# Patient Record
Sex: FEMALE | Race: OTHER | NOT HISPANIC OR LATINO | URBAN - METROPOLITAN AREA
[De-identification: names, ages, dates, MRNs, and addresses within clinical notes are randomized per-mention and may not be internally consistent; named-entity substitution may affect disease eponyms.]

---

## 2019-12-05 ENCOUNTER — EMERGENCY (EMERGENCY)
Facility: HOSPITAL | Age: 64
LOS: 1 days | Discharge: HOME | End: 2019-12-05
Admitting: EMERGENCY MEDICINE
Payer: OTHER MISCELLANEOUS

## 2019-12-05 VITALS
TEMPERATURE: 98 F | SYSTOLIC BLOOD PRESSURE: 131 MMHG | RESPIRATION RATE: 18 BRPM | HEART RATE: 57 BPM | DIASTOLIC BLOOD PRESSURE: 86 MMHG | WEIGHT: 111.99 LBS | OXYGEN SATURATION: 96 % | HEIGHT: 59 IN
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
## 2019-12-05 DIAGNOSIS — M25.521 PAIN IN RIGHT ELBOW: ICD-10-CM
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
## 2019-12-05 PROCEDURE — 99283 EMERGENCY DEPT VISIT LOW MDM: CPT

## 2019-12-05 PROCEDURE — 73080 X-RAY EXAM OF ELBOW: CPT | Mod: 26,RT

## 2019-12-05 PROCEDURE — 73080 X-RAY EXAM OF ELBOW: CPT

## 2019-12-05 RX ORDER — IBUPROFEN 200 MG
1 TABLET ORAL
Qty: 21 | Refills: 0
Start: 2019-12-05 | End: 2019-12-11

## 2019-12-05 RX ORDER — IBUPROFEN 200 MG
600 TABLET ORAL ONCE
Refills: 0 | Status: COMPLETED | OUTPATIENT
Start: 2019-12-05 | End: 2019-12-05

## 2019-12-05 RX ADMIN — Medication 600 MILLIGRAM(S): at 15:07

## 2019-12-05 NOTE — ED ADULT NURSE NOTE - OBJECTIVE STATEMENT
63 yo F presents to Ed c/o R elbow pain. Pt states, " I had an injury on the 29th, the freezer door hit into my arm, and its been hurting since then and it's hard for me to make a fist now." No obvious malformation, deformity, bruising, rash noted. Upon assessment pt skin is warm to touch, no swelling noted, slight limited ROM of RUE, cap refill <2 seconds, decreased  strength to R hand. Pt denies any numbness, tingling, medication use for the pain. NAD, VSS, will continue to assess.

## 2019-12-05 NOTE — ED PROVIDER NOTE - OBJECTIVE STATEMENT
65 yo female in the ER c/o right arm pain x 1 week. Pt states about a week ago heavy door from a freezer fell on her right elbow. Pt able to move her arm and elbow, but pain persist and she is concerned about possible fx. Pt also reports her right hand   is not so strong as usually. Pt denies numbness, tingling, discoloration to her right arm.

## 2019-12-05 NOTE — ED PROVIDER NOTE - PATIENT PORTAL LINK FT
You can access the FollowMyHealth Patient Portal offered by [facility] by registering at the following website: [email or web address]. By joining [employer]’s FollowMyHealth portal, you will also be able to view your health information using other applications (apps) compatible with our system.

## 2019-12-05 NOTE — ED PROVIDER NOTE - NSFOLLOWUPINSTRUCTIONS_ED_ALL_ED_FT
I have discussed the discharge plan with the patient. The patient agrees with the plan, as discussed.  The patient understands Emergency Department diagnosis is a preliminary diagnosis often based on limited information and that the patient must adhere to the follow-up plan as discussed.  The patient understands that if the symptoms worsen or if prescribed medications do not have the desired/planned effect that the patient may return to the Emergency Department at any time for further evaluation and treatment.      Elbow Contusion  An elbow contusion is a deep bruise of the elbow. Contusions are the result of a blunt injury to tissues and muscle fibers under the skin. The injury causes bleeding under the skin. The skin overlying the contusion may turn blue, purple, or yellow. Minor injuries will give you a painless contusion, but more severe contusions may stay painful and swollen for a few weeks.  What are the causes?  Common causes of this condition include:  A hard hit to the elbow.An injury (trauma) to the elbow.Direct force on the elbow, such as from a fall.What increases the risk?  You are more likely to develop this condition if you:  Play sports or do other physical activities.Use blood thinners.What are the signs or symptoms?  Symptoms of this condition include:  Swelling of the elbow.Pain and tenderness of the elbow.Discoloration of the elbow. The area may have redness and then turn blue, purple, or yellow.How is this diagnosed?  This condition is diagnosed based on:  Your symptoms and medical history.A physical exam.You may also need an X-ray to determine if there are any associated injuries, such as broken bones (fractures).  An MRI might be done if the swelling and pain do not go away in a few weeks.  How is this treated?  This condition may be treated with:  Rest, ice, pressure (compression), and elevation. This is often called RICE therapy. In general, this is considered the best treatment for this condition.A sling or splint to support your injury.Over-the-counter anti-inflammatory medicines, such as ibuprofen, for pain control.Range-of-motion exercises.Follow these instructions at home:  RICE therapy     Rest the injured area.If directed, put ice on the injured area:  If you have a removable sling or splint, remove it as told by your health care provider.Put ice in a plastic bag.Place a towel between your skin and the bag.Leave the ice on for 20 minutes, 2–3 times a day.If directed, apply light compression to the injured area using an elastic bandage. Make sure the bandage is not wrapped too tightly. Remove and reapply the bandage as directed by your health care provider.Raise (elevate) the injured area above the level of your heart while you are sitting or lying down.If you have a sling or splint:        Wear the sling or splint as told by your health care provider. Remove it only as told by your health care provider.Loosen the sling or splint if your fingers tingle, become numb, or turn cold and blue.Keep the sling or splint clean.If the sling or splint is not waterproof:  Do not let it get wet.Cover it with a watertight covering when you take a bath or a shower.General instructions     Take over-the-counter and prescription medicines only as told by your health care provider.Return to your normal activities as told by your health care provider. Ask your health care provider what activities are safe for you.Do range-of-motion exercises only as told by your health care provider.Ask your health care provider when it is safe to drive if you have a sling or splint on your arm.Wear elbow pads as told by your health care provider.Keep all follow-up visits as told by your health care provider. This is important.Contact a health care provider if:  Your symptoms do not improve after several days of treatment.You have more redness, swelling, or pain in your elbow.You have difficulty moving the injured area.Your swelling or pain is not relieved with medicines.Get help right away if:  Your skin over the contusion breaks and starts bleeding.You have severe pain.You have numbness in your hand or fingers.Your hand or fingers turn pale or cold.You have swelling of your hand and fingers.You cannot move your fingers or wrist.Summary  An elbow contusion is a deep bruise of the elbow.Symptoms include pain, swelling, and discoloration of the elbow.Rest the injured area and apply ice to the area as told by your health care provider.If directed, apply light compression to the injured area using an elastic bandage.Raise (elevate) the injured area above the level of your heart while you are sitting or lying down.This information is not intended to replace advice given to you by your health care provider. Make sure you discuss any questions you have with your health care provider.

## 2019-12-05 NOTE — ED PROVIDER NOTE - MUSCULOSKELETAL, MLM
Spine and all extremities grossly appears normal, range of motion is not limited,  diffuse tenderness to elbow joint, mild edema,

## 2019-12-05 NOTE — ED PROVIDER NOTE - CLINICAL SUMMARY MEDICAL DECISION MAKING FREE TEXT BOX
65 yo female in the ER had accidentally injured her right elbow 1 week ago- pt reports heavy freezer door fell on her elbow. Pt is able to move her elbow but its still very painful pain radiate above and below her injured elbow.

## 2019-12-05 NOTE — ED PROVIDER NOTE - CARE PROVIDER_API CALL
Az Herrmann)  Orthopaedic Surgery  [address] [place] [address]  Phone: [phone]  Fax: [phone]  Follow Up Time:

## 2021-12-25 ENCOUNTER — EMERGENCY (EMERGENCY)
Facility: HOSPITAL | Age: 66
LOS: 1 days | Discharge: HOME | End: 2021-12-25
Attending: STUDENT IN AN ORGANIZED HEALTH CARE EDUCATION/TRAINING PROGRAM | Admitting: STUDENT IN AN ORGANIZED HEALTH CARE EDUCATION/TRAINING PROGRAM
Payer: COMMERCIAL

## 2021-12-25 VITALS
HEIGHT: 59 IN | WEIGHT: 134.92 LBS | RESPIRATION RATE: 20 BRPM | TEMPERATURE: 98 F | HEART RATE: 100 BPM | OXYGEN SATURATION: 97 % | DIASTOLIC BLOOD PRESSURE: 85 MMHG | SYSTOLIC BLOOD PRESSURE: 127 MMHG

## 2021-12-25 DIAGNOSIS — Z87.891 PERSONAL HISTORY OF NICOTINE DEPENDENCE: ICD-10-CM

## 2021-12-25 DIAGNOSIS — H11.152 PINGUECULA, LEFT EYE: ICD-10-CM

## 2021-12-25 DIAGNOSIS — E78.5 HYPERLIPIDEMIA, UNSPECIFIED: ICD-10-CM

## 2021-12-25 DIAGNOSIS — Z20.822 CONTACT WITH AND (SUSPECTED) EXPOSURE TO COVID-19: ICD-10-CM

## 2021-12-25 DIAGNOSIS — R42 DIZZINESS AND GIDDINESS: ICD-10-CM

## 2021-12-25 LAB
ALBUMIN SERPL ELPH-MCNC: 4.4 G/DL — SIGNIFICANT CHANGE UP (ref 3.3–5)
ALP SERPL-CCNC: 100 U/L — SIGNIFICANT CHANGE UP (ref 40–120)
ALT FLD-CCNC: 21 U/L — SIGNIFICANT CHANGE UP (ref 10–45)
ANION GAP SERPL CALC-SCNC: 12 MMOL/L — SIGNIFICANT CHANGE UP (ref 5–17)
APTT BLD: 32.4 SEC — SIGNIFICANT CHANGE UP (ref 27.5–35.5)
AST SERPL-CCNC: 23 U/L — SIGNIFICANT CHANGE UP (ref 10–40)
BASOPHILS # BLD AUTO: 0.04 K/UL — SIGNIFICANT CHANGE UP (ref 0–0.2)
BASOPHILS NFR BLD AUTO: 0.6 % — SIGNIFICANT CHANGE UP (ref 0–2)
BILIRUB SERPL-MCNC: 0.2 MG/DL — SIGNIFICANT CHANGE UP (ref 0.2–1.2)
BUN SERPL-MCNC: 11 MG/DL — SIGNIFICANT CHANGE UP (ref 7–23)
CALCIUM SERPL-MCNC: 9.2 MG/DL — SIGNIFICANT CHANGE UP (ref 8.4–10.5)
CHLORIDE SERPL-SCNC: 108 MMOL/L — SIGNIFICANT CHANGE UP (ref 96–108)
CO2 SERPL-SCNC: 24 MMOL/L — SIGNIFICANT CHANGE UP (ref 22–31)
CREAT SERPL-MCNC: 0.76 MG/DL — SIGNIFICANT CHANGE UP (ref 0.5–1.3)
CRP SERPL-MCNC: <3 MG/L — SIGNIFICANT CHANGE UP (ref 0–4)
EOSINOPHIL # BLD AUTO: 0.22 K/UL — SIGNIFICANT CHANGE UP (ref 0–0.5)
EOSINOPHIL NFR BLD AUTO: 3.1 % — SIGNIFICANT CHANGE UP (ref 0–6)
GLUCOSE SERPL-MCNC: 155 MG/DL — HIGH (ref 70–99)
HCT VFR BLD CALC: 40.2 % — SIGNIFICANT CHANGE UP (ref 34.5–45)
HGB BLD-MCNC: 13.4 G/DL — SIGNIFICANT CHANGE UP (ref 11.5–15.5)
IMM GRANULOCYTES NFR BLD AUTO: 0.1 % — SIGNIFICANT CHANGE UP (ref 0–1.5)
INR BLD: 1.02 — SIGNIFICANT CHANGE UP (ref 0.88–1.16)
LYMPHOCYTES # BLD AUTO: 3.12 K/UL — SIGNIFICANT CHANGE UP (ref 1–3.3)
LYMPHOCYTES # BLD AUTO: 44.6 % — HIGH (ref 13–44)
MCHC RBC-ENTMCNC: 30 PG — SIGNIFICANT CHANGE UP (ref 27–34)
MCHC RBC-ENTMCNC: 33.3 GM/DL — SIGNIFICANT CHANGE UP (ref 32–36)
MCV RBC AUTO: 89.9 FL — SIGNIFICANT CHANGE UP (ref 80–100)
MONOCYTES # BLD AUTO: 0.71 K/UL — SIGNIFICANT CHANGE UP (ref 0–0.9)
MONOCYTES NFR BLD AUTO: 10.2 % — SIGNIFICANT CHANGE UP (ref 2–14)
NEUTROPHILS # BLD AUTO: 2.89 K/UL — SIGNIFICANT CHANGE UP (ref 1.8–7.4)
NEUTROPHILS NFR BLD AUTO: 41.4 % — LOW (ref 43–77)
NRBC # BLD: 0 /100 WBCS — SIGNIFICANT CHANGE UP (ref 0–0)
PLATELET # BLD AUTO: 306 K/UL — SIGNIFICANT CHANGE UP (ref 150–400)
POTASSIUM SERPL-MCNC: 4.1 MMOL/L — SIGNIFICANT CHANGE UP (ref 3.5–5.3)
POTASSIUM SERPL-SCNC: 4.1 MMOL/L — SIGNIFICANT CHANGE UP (ref 3.5–5.3)
PROT SERPL-MCNC: 7.4 G/DL — SIGNIFICANT CHANGE UP (ref 6–8.3)
PROTHROM AB SERPL-ACNC: 12.2 SEC — SIGNIFICANT CHANGE UP (ref 10.6–13.6)
RBC # BLD: 4.47 M/UL — SIGNIFICANT CHANGE UP (ref 3.8–5.2)
RBC # FLD: 14.2 % — SIGNIFICANT CHANGE UP (ref 10.3–14.5)
SODIUM SERPL-SCNC: 144 MMOL/L — SIGNIFICANT CHANGE UP (ref 135–145)
TROPONIN T SERPL-MCNC: 0.01 NG/ML — SIGNIFICANT CHANGE UP (ref 0–0.01)
WBC # BLD: 6.99 K/UL — SIGNIFICANT CHANGE UP (ref 3.8–10.5)
WBC # FLD AUTO: 6.99 K/UL — SIGNIFICANT CHANGE UP (ref 3.8–10.5)

## 2021-12-25 PROCEDURE — 0042T: CPT

## 2021-12-25 PROCEDURE — 71045 X-RAY EXAM CHEST 1 VIEW: CPT | Mod: 26

## 2021-12-25 PROCEDURE — 70450 CT HEAD/BRAIN W/O DYE: CPT | Mod: 26,59,MA

## 2021-12-25 PROCEDURE — 99284 EMERGENCY DEPT VISIT MOD MDM: CPT

## 2021-12-25 PROCEDURE — 70498 CT ANGIOGRAPHY NECK: CPT | Mod: 26,MA

## 2021-12-25 PROCEDURE — 99285 EMERGENCY DEPT VISIT HI MDM: CPT | Mod: 25

## 2021-12-25 PROCEDURE — 70496 CT ANGIOGRAPHY HEAD: CPT | Mod: 26,MA

## 2021-12-25 RX ORDER — ASPIRIN/CALCIUM CARB/MAGNESIUM 324 MG
324 TABLET ORAL ONCE
Refills: 0 | Status: COMPLETED | OUTPATIENT
Start: 2021-12-25 | End: 2021-12-25

## 2021-12-25 RX ADMIN — Medication 324 MILLIGRAM(S): at 19:46

## 2021-12-25 NOTE — ED PROVIDER NOTE - NSFOLLOWUPINSTRUCTIONS_ED_ALL_ED_FT
1) Please follow-up with an ophthalmologist about your eye pain - please call the number below to be assisted with making an appointment.  If you cannot follow-up with your doctor(s), please return to the ED for any urgent issues.  2) If you have any worsening of symptoms including severe headache, difficulty balancing, loss of vision, or any other concerns please return to the ED immediately.  3) Please continue taking your home medications as directed.  4) You may have been given a copy of your labs and/or imaging.  Please go over these with your primary care doctor.   5) A prescription has been sent to your pharmacy. Please take it as directed.       Please reach out to Brittney Alejandro ([facility] ED clinical referral coordinator) to assist you with your follow-up appointment.     Monday - Friday 11am-7pm  [phone]  [email or web address]

## 2021-12-25 NOTE — CONSULT NOTE ADULT - ASSESSMENT
ASSESSMENT/PLAN: ASSESSMENT/PLAN: 66y Chinese speaking Female with PMHx of HLD (on atorvastatin 10) and PUD, presenting to the ED with dizziness, left eye blurriness, and nausea. Pt's vision improved followed by left eye pain and redness, but she felt unsteady when walking. 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 On reeval, pt able to ambulate steadily with narrow gait and no dizziness. Her only complaint at this time is left eye pain, and eye remains erythematous. Unlikely CVA as cause for her symptoms. ESR and CRP are normal therefore less suspicion for temporal arteritis. Recommend ophthalmology evaluation for her eye.     Thank you for allowing us to participate in the care of this patient, please call Neurology with questions as needed.   - case discussed with Dr Garrido stroke attending and Dr. Gomez ED attending

## 2021-12-25 NOTE — ED PROVIDER NOTE - PATIENT PORTAL LINK FT
You can access the FollowMyHealth Patient Portal offered by [facility] by registering at the following website: [email or web address]. By joining [facility]’s FollowMyHealth portal, you will also be able to view your health information using other applications (apps) compatible with our system.

## 2021-12-25 NOTE — ED PROVIDER NOTE - OBJECTIVE STATEMENT
65 y/o F pt w/ PMHx of HLD and PUD, presents to the ED for intermittent dizziness sensation, nausea, and left eye blurriness since 10:00PM last night. At present in the ED, patient states symptoms have abated. Denies prior history of symptoms, and associated CP, palpitations, vomiting, coughing, fevers, chills, and HA. Patient has a remote history of smoking , but quit smoking cigarettes over 30 years ago. 67 y/o F pt w/ PMHx of HLD and PUD, presents to the ED for intermittent dizziness sensation, nausea, and left eye blurriness since 10:00PM last night. At present in the ED, patient states symptoms have abated. Denies prior history of symptoms, and associated CP, palpitations, vomiting, coughing, fevers, chills, and HA. Patient has a remote history of smoking, but quit smoking cigarettes over 30 years ago.

## 2021-12-25 NOTE — ED PROVIDER NOTE - CLINICAL SUMMARY MEDICAL DECISION MAKING FREE TEXT BOX
65 y/o F pt who is a well-appearing female in no apparent distress and stable vital signs, presents to the ED for dizziness, nausea, and L eye blurriness since 10:00PM last night. Patient with NIHSS of 0 at present. On exam, patient has what appears to be a pinguecula affecting the left eye which she says is new since yesterday. As such, I doubt a neurologic component to vision change. However, given dizziness complaint, will obtain CT scan to assess for possible posterior CVA. If workup is negative, will consider discharge with close neurology and ophthalmology f/u. 67 y/o F pt who is a well-appearing female, in no apparent distress and has stable vital signs, presents to the ED for dizziness, nausea, and L eye blurriness since 10:00PM last night. Patient has a NIHSS of 0 at present. On exam, patient has what appears to be a pinguecula affecting the left eye, which she says is new since yesterday. As such, I doubt a neurologic component to her vision change. However, given dizziness complaint, will obtain CT scan to assess for possible posterior CVA. If workup is negative, will consider discharge with close neurology and ophthalmology f/u.

## 2021-12-25 NOTE — ED ADULT NURSE NOTE - OBJECTIVE STATEMENT
Patient presents to the ED complaining of dizziness from last night around 10AM. As per son, patient also noted L eye redness today with eye swelling and also had blurry vision yesterday. Patient denies any weakness. No facial droop noted. Patient presents to the ED complaining of dizziness from last night around 10AM. As per son, patient also noted L eye redness today with eye swelling and also had blurry vision yesterday in the L eye Patient denies any weakness. No facial droop noted. Patient not slurry her speech. History given by son Andrés. Patient presents to the ED complaining of dizziness from last night around 10AM. As per son, patient also noted L eye redness today with eye swelling and also had blurry vision yesterday in the L eye Patient denies any weakness. No facial droop noted. Patient not slurry her speech. History given by son Andrés. Patient upgraded to MD Gomez.

## 2021-12-25 NOTE — ED PROVIDER NOTE - NSPTACCESSSVCSAPPTDETAILS_ED_ALL_ED_FT
pt w/ L eye pain and physical exam abnormality c/w pinguecula. Should see optho very soon. pt w/ L eye pain and physical exam abnormality c/w pinguecula. Should see optho very soon.  Should also be referred to neuro for her dizziness.  thanks again!

## 2021-12-25 NOTE — ED PROVIDER NOTE - PHYSICAL EXAMINATION
Constitutional: Well appearing, awake, alert, oriented to person, place, time/situation and in no apparent distress.  ENMT: Airway patent.  Eyes: Clear bilaterally, pupils equal, round and reactive to light b/l. (+) pinguecula to the L lateral sclera.    Cardiac: Normal rate, regular rhythm.  Heart sounds S1, S2.  Respiratory: Breath sounds clear and equal bilaterally.  Gastrointestinal: Abdomen soft, non-tender, no guarding.  Neurological: NIH scale provided below  Level of Consciousness: 0  Knows Month and Age: 0  Follows commands: 0  Horizontal EOM: 0  Visual Fields: 0  Facial Palsy: 0  Left Arm Motor Drift: 0  Right Arm Motor Drift: 0  Left Leg Motor Drift: 0  Right Leg Motor Drift: 0  Limb Ataxia: 0  Sensation: 0  Language Aphasia: 0  Dysarthria: 0  Extinction / Inattention: 0  NIHSS: 0  Skin: No evidence of rash. Constitutional: Well appearing, awake, alert, oriented to person, place, time/situation and in no apparent distress.  ENMT: Airway patent.  Eyes: Clear bilaterally, pupils equal, round and reactive to light b/l. (+) pinguecula to the L lateral sclera.    Cardiac: Normal rate, regular rhythm.  Heart sounds S1, S2.  Respiratory: Breath sounds clear and equal bilaterally.  Gastrointestinal: Abdomen soft, non-tender, no guarding.  Neurological: NIHSS scale provided below.    Level of Consciousness: 0    Knows Month and Age: 0    Follows commands: 0    Horizontal EOM: 0    Visual Fields: 0    Facial Palsy: 0    Left Arm Motor Drift: 0    Right Arm Motor Drift: 0    Left Leg Motor Drift: 0    Right Leg Motor Drift: 0    Limb Ataxia: 0    Sensation: 0    Language Aphasia: 0    Dysarthria: 0    Extinction / Inattention: 0   NIHSS: 0  Skin: No evidence of rash. Constitutional: Well appearing, awake, alert, oriented to person, place, time/situation and in no apparent distress.  ENMT: Airway patent.  Eyes: Clear bilaterally, pupils equal, round and reactive to light b/l. (+) pinguecula to the L lateral sclera.    Cardiac: Normal rate, regular rhythm.  Heart sounds S1, S2.  Respiratory: Breath sounds clear and equal bilaterally.  Gastrointestinal: Abdomen soft, non-tender, no guarding.  Neurological: NIHSS provided below.    Level of Consciousness: 0    Knows Month and Age: 0    Follows commands: 0    Horizontal EOM: 0    Visual Fields: 0    Facial Palsy: 0    Left Arm Motor Drift: 0    Right Arm Motor Drift: 0    Left Leg Motor Drift: 0    Right Leg Motor Drift: 0    Limb Ataxia: 0    Sensation: 0    Language Aphasia: 0    Dysarthria: 0    Extinction / Inattention: 0   NIHSS: 0  Skin: No evidence of rash. Constitutional: Well appearing, awake, alert, oriented to person, place, time/situation and in no apparent distress.  ENMT: Airway patent.  Eyes: Clear bilaterally, pupils equal, round and reactive to light b/l. (+) pinguecula to the L lateral sclera.  IOP of 18.8.  Cardiac: Normal rate, regular rhythm.  Heart sounds S1, S2.  Respiratory: Breath sounds clear and equal bilaterally.  Gastrointestinal: Abdomen soft, non-tender, no guarding.  Neurological: NIHSS provided below.    Level of Consciousness: 0    Knows Month and Age: 0    Follows commands: 0    Horizontal EOM: 0    Visual Fields: 0    Facial Palsy: 0    Left Arm Motor Drift: 0    Right Arm Motor Drift: 0    Left Leg Motor Drift: 0    Right Leg Motor Drift: 0    Limb Ataxia: 0    Sensation: 0    Language Aphasia: 0    Dysarthria: 0    Extinction / Inattention: 0   NIHSS: 0  Skin: No evidence of rash.

## 2021-12-25 NOTE — CONSULT NOTE ADULT - SUBJECTIVE AND OBJECTIVE BOX
**STROKE NEUROLOGY CONSULT NOTE***    HPI: 66y Female with PMHx of HLD (on atorvastatin 10) and PUD,     PAST MEDICAL & SURGICAL HISTORY:      FAMILY HISTORY:      SOCIAL HISTORY:   Patient lives with *** at ***.   Smoking status:  Drinking:  Drug Use:     ROS: ***  Constitutional: No fever, weight loss or fatigue  Eyes: No eye pain, visual disturbances, or discharge  ENMT:  No difficulty hearing, tinnitus, vertigo; No sinus or throat pain  Neck: No pain or stiffness  Respiratory: No cough, wheezing, chills or hemoptysis  Cardiovascular: No chest pain, palpitations, shortness of breath, dizziness or leg swelling  Gastrointestinal: No abdominal pain. No nausea, vomiting or hematemesis; No diarrhea or constipation. Nohematochezia.  Genitourinary: No dysuria, frequency, hematuria or incontinence  Neurological: As per HPI  Skin: No itching, burning, rashes or lesions   Endocrine: No heat or cold intolerance; No hair loss  Musculoskeletal: No joint pain or swelling; No muscle, back or extremity pain  Psychiatric: No depression, anxiety, mood swings or difficulty sleeping  Heme/Lymph: No easy bruising or bleeding gums    T(C): 36.8 (12-25-21 @ 19:06), Max: 36.8 (12-25-21 @ 19:06)  HR: 100 (12-25-21 @ 19:06) (100 - 100)  BP: 127/85 (12-25-21 @ 19:06) (127/85 - 127/85)  RR: 20 (12-25-21 @ 19:06) (20 - 20)  SpO2: 97% (12-25-21 @ 19:06) (97% - 97%)    MEDICATION RECONCILIATION   MEDICATIONS  (STANDING):    MEDICATIONS  (PRN):    Allergies    No Known Allergies    Intolerances      Vital Signs Last 24 Hrs  T(C): 36.8 (25 Dec 2021 19:06), Max: 36.8 (25 Dec 2021 19:06)  T(F): 98.2 (25 Dec 2021 19:06), Max: 98.2 (25 Dec 2021 19:06)  HR: 100 (25 Dec 2021 19:06) (100 - 100)  BP: 127/85 (25 Dec 2021 19:06) (127/85 - 127/85)  BP(mean): --  RR: 20 (25 Dec 2021 19:06) (20 - 20)  SpO2: 97% (25 Dec 2021 19:06) (97% - 97%)    Physical exam:  General: No acute distress, awake and alert  Cardiovascular: Regular rate and rhythm, no murmurs, rubs, or gallops. No bruits  Pulmonary: Anterior breath sounds clear bilaterally, no crackles or wheezing. No use of accessory muscles  GI: Abdomen soft, non-tender, non-distended    Neurologic:  -Mental status: Awake, alert, oriented to person, place, and time. Speech is fluent with intact naming, repetition, and comprehension, no dysarthria. Recent and remote memory intact. Follows commands. Attention/concentration intact. Fund of knowledge appropriate.  -Cranial nerves:   II: Visual fields are full to confrontation.  III, IV, VI: Extraocular movements are intact without nystagmus. Pupils equally round and reactive to light  V:  Facial sensation V1-V3 equal and intact   VII: Face is symmetric with normal eye closure and smile  VIII: Hearing is bilaterally intact to finger rub  IX, X: Uvula is midline and soft palate rises symmetrically  XI: Head turning and shoulder shrug are intact.  XII: Tongue protrudes midline  Motor: Normal bulk and tone. No pronator drift. Strength bilateral upper extremity 5/5, bilateral lower extremities 5/5.  Rapid alternating movements intact and symmetric  Sensation: Intact to light touch bilaterally. No neglect or extinction on double simultaneous testing.  Coordination: No dysmetria on finger-to-nose and heel-to-shin bilaterally  Reflexes: Downgoing toes bilaterally   Gait: Narrow gait and steady    NIHSS: **** ASPECT Score: *** ICH Score: *** (GCS)    Fingerstick Blood Glucose: CAPILLARY BLOOD GLUCOSE      POCT Blood Glucose.: 150 mg/dL (25 Dec 2021 19:03)    LABS:                        13.4   6.99  )-----------( 306      ( 25 Dec 2021 19:30 )             40.2     12-25    144  |  108  |  11  ----------------------------<  155<H>  4.1   |  24  |  0.76    Ca    9.2      25 Dec 2021 19:30    TPro  7.4  /  Alb  4.4  /  TBili  0.2  /  DBili  x   /  AST  23  /  ALT  21  /  AlkPhos  100  12-25    PT/INR - ( 25 Dec 2021 19:30 )   PT: 12.2 sec;   INR: 1.02          PTT - ( 25 Dec 2021 19:30 )  PTT:32.4 sec  CARDIAC MARKERS ( 25 Dec 2021 19:30 )  x     / 0.01 ng/mL / x     / x     / x              RADIOLOGY & ADDITIONAL STUDIES:    HCT:     CTA:    -----------------------------------------------------------------------------------------    ASSESSMENT/PLAN:    66y Female w/ PMH ***. HCT showed ***. CTA showed ***. Given *** tPA was ***. Patient was admitted to **** for further workup    **STROKE NEUROLOGY CONSULT NOTE***    HPI: 66y Sami speaking Female with PMHx of HLD (on atorvastatin 10) and PUD, presenting to the ED with dizziness. Pt woke up this AM and the corner of her left eye was blurry only when looking from her left eye (normal when looking from the right eye with the left eye covered). She also had room spinning dizziness and nausea. Pt's vision improved back to normal but then her left eye started hurting when she moves it, and she noticed it was red in the corner. Pt feels unsteady now when walking. (She does walk with a limp at baseline per patient and son due to left knee and left hip surgery). On eval, NIHSS 0. Denies headache, vomiting, speech changes. Denies chest pain or SOB. Denies ear pain or fullness.      # [id number]    PAST MEDICAL & SURGICAL HISTORY:  Left hip repair  Left knee meniscus repair    FAMILY HISTORY:      ROS: ***  Constitutional: No fever, weight loss or fatigue  Eyes: + eye pain, no discharge  ENMT:  No difficulty hearing, tinnitus, vertigo; No sinus or throat pain  Neck: No pain or stiffness  Respiratory: No cough, wheezing, chills or hemoptysis  Cardiovascular: No chest pain, palpitations, shortness of breath, dizziness or leg swelling  Gastrointestinal: No abdominal pain. No nausea, vomiting or hematemesis; No diarrhea or constipation. Nohematochezia.  Genitourinary: No dysuria, frequency, hematuria or incontinence  Neurological: As per HPI  Skin: No itching, burning, rashes or lesions   Endocrine: No heat or cold intolerance; No hair loss  Musculoskeletal: No joint pain or swelling; No muscle, back or extremity pain  Psychiatric: No depression, anxiety, mood swings or difficulty sleeping  Heme/Lymph: No easy bruising or bleeding gums    T(C): 36.8 (12-25-21 @ 19:06), Max: 36.8 (12-25-21 @ 19:06)  HR: 100 (12-25-21 @ 19:06) (100 - 100)  BP: 127/85 (12-25-21 @ 19:06) (127/85 - 127/85)  RR: 20 (12-25-21 @ 19:06) (20 - 20)  SpO2: 97% (12-25-21 @ 19:06) (97% - 97%)    MEDICATION RECONCILIATION   MEDICATIONS  (STANDING):    MEDICATIONS  (PRN):    Allergies    No Known Allergies    Intolerances      Vital Signs Last 24 Hrs  T(C): 36.8 (25 Dec 2021 19:06), Max: 36.8 (25 Dec 2021 19:06)  T(F): 98.2 (25 Dec 2021 19:06), Max: 98.2 (25 Dec 2021 19:06)  HR: 100 (25 Dec 2021 19:06) (100 - 100)  BP: 127/85 (25 Dec 2021 19:06) (127/85 - 127/85)  BP(mean): --  RR: 20 (25 Dec 2021 19:06) (20 - 20)  SpO2: 97% (25 Dec 2021 19:06) (97% - 97%)    Physical exam:  General: No acute distress, awake and alert  HEENT: + Left eye erythematous laterally.   Cardiovascular: Regular rate and rhythm, no murmurs, rubs, or gallops. No bruits  Pulmonary: Anterior breath sounds clear bilaterally, no crackles or wheezing. No use of accessory muscles  GI: Abdomen soft, non-tender, non-distended    Neurologic:  -Mental status: Awake, alert, oriented to person, place, and time. Speech is fluent with intact naming, repetition, and comprehension, no dysarthria. Recent and remote memory intact. Follows commands. Attention/concentration intact. Fund of knowledge appropriate.  -Cranial nerves:   II: Visual fields are full to confrontation.  III, IV, VI: Extraocular movements are intact without nystagmus. Pupils equally round and reactive to light  V:  Facial sensation V1-V3 equal and intact   VII: Face is symmetric with normal eye closure and smile  VIII: Hearing is bilaterally intact to finger rub  IX, X: Uvula is midline and soft palate rises symmetrically  XI: Head turning and shoulder shrug are intact.  XII: Tongue protrudes midline  Motor: Normal bulk and tone. No pronator drift. Strength bilateral upper extremity 5/5, bilateral lower extremities 5/5.  Rapid alternating movements intact and symmetric  Sensation: Intact to light touch bilaterally. No neglect or extinction on double simultaneous testing.  Coordination: No dysmetria on finger-to-nose and heel-to-shin bilaterally  Reflexes: Downgoing toes bilaterally   Gait: Unsteady gait.     NIHSS: 0    Fingerstick Blood Glucose: CAPILLARY BLOOD GLUCOSE      POCT Blood Glucose.: 150 mg/dL (25 Dec 2021 19:03)    LABS:                        13.4   6.99  )-----------( 306      ( 25 Dec 2021 19:30 )             40.2     12-25    144  |  108  |  11  ----------------------------<  155<H>  4.1   |  24  |  0.76    Ca    9.2      25 Dec 2021 19:30    TPro  7.4  /  Alb  4.4  /  TBili  0.2  /  DBili  x   /  AST  23  /  ALT  21  /  AlkPhos  100  12-25    PT/INR - ( 25 Dec 2021 19:30 )   PT: 12.2 sec;   INR: 1.02          PTT - ( 25 Dec 2021 19:30 )  PTT:32.4 sec  CARDIAC MARKERS ( 25 Dec 2021 19:30 )  x     / 0.01 ng/mL / x     / x     / x              RADIOLOGY & ADDITIONAL STUDIES:    HCT:     CTA:         **STROKE NEUROLOGY CONSULT NOTE***    HPI: 66y Bengali speaking Female with PMHx of HLD (on atorvastatin 10) and PUD, presenting to the ED with dizziness. Pt woke up this AM and the corner of her left eye was blurry only when looking from her left eye (normal when looking from the right eye with the left eye covered). She also had room spinning dizziness and nausea. Pt's vision improved back to normal but then her left eye started hurting when she moves it, and she noticed it was red in the corner. Pt feels unsteady now when walking. (She does walk with a limp at baseline per patient and son due to left knee and left hip surgery). On eval, NIHSS 0. Denies headache, vomiting, speech changes. Denies chest pain or SOB. Denies ear pain or fullness.      # [id number]    PAST MEDICAL & SURGICAL HISTORY:  Left hip repair  Left knee meniscus repair    FAMILY HISTORY:      ROS: ***  Constitutional: No fever, weight loss or fatigue  Eyes: + eye pain, no discharge  ENMT:  No difficulty hearing, tinnitus, vertigo; No sinus or throat pain  Neck: No pain or stiffness  Respiratory: No cough, wheezing, chills or hemoptysis  Cardiovascular: No chest pain, palpitations, shortness of breath, dizziness or leg swelling  Gastrointestinal: No abdominal pain. No nausea, vomiting or hematemesis; No diarrhea or constipation. Nohematochezia.  Genitourinary: No dysuria, frequency, hematuria or incontinence  Neurological: As per HPI  Skin: No itching, burning, rashes or lesions   Endocrine: No heat or cold intolerance; No hair loss  Musculoskeletal: No joint pain or swelling; No muscle, back or extremity pain  Psychiatric: No depression, anxiety, mood swings or difficulty sleeping  Heme/Lymph: No easy bruising or bleeding gums    T(C): 36.8 (12-25-21 @ 19:06), Max: 36.8 (12-25-21 @ 19:06)  HR: 100 (12-25-21 @ 19:06) (100 - 100)  BP: 127/85 (12-25-21 @ 19:06) (127/85 - 127/85)  RR: 20 (12-25-21 @ 19:06) (20 - 20)  SpO2: 97% (12-25-21 @ 19:06) (97% - 97%)    MEDICATION RECONCILIATION   MEDICATIONS  (STANDING):    MEDICATIONS  (PRN):    Allergies    No Known Allergies    Intolerances      Vital Signs Last 24 Hrs  T(C): 36.8 (25 Dec 2021 19:06), Max: 36.8 (25 Dec 2021 19:06)  T(F): 98.2 (25 Dec 2021 19:06), Max: 98.2 (25 Dec 2021 19:06)  HR: 100 (25 Dec 2021 19:06) (100 - 100)  BP: 127/85 (25 Dec 2021 19:06) (127/85 - 127/85)  BP(mean): --  RR: 20 (25 Dec 2021 19:06) (20 - 20)  SpO2: 97% (25 Dec 2021 19:06) (97% - 97%)    Physical exam:  General: No acute distress, awake and alert  HEENT: + Left eye erythematous laterally.   Cardiovascular: Regular rate and rhythm, no murmurs, rubs, or gallops. No bruits  Pulmonary: Anterior breath sounds clear bilaterally, no crackles or wheezing. No use of accessory muscles  GI: Abdomen soft, non-tender, non-distended    Neurologic:  -Mental status: Awake, alert, oriented to person, place, and time. Speech is fluent with intact naming, repetition, and comprehension, no dysarthria. Recent and remote memory intact. Follows commands. Attention/concentration intact. Fund of knowledge appropriate.  -Cranial nerves:   II: Visual fields are full to confrontation.  III, IV, VI: Extraocular movements are intact without nystagmus. Pupils equally round and reactive to light  V:  Facial sensation V1-V3 equal and intact   VII: Face is symmetric with normal eye closure and smile  VIII: Hearing is bilaterally intact to finger rub  IX, X: Uvula is midline and soft palate rises symmetrically  XI: Head turning and shoulder shrug are intact.  XII: Tongue protrudes midline  Motor: Normal bulk and tone. No pronator drift. Strength bilateral upper extremity 5/5, bilateral lower extremities 5/5.  Rapid alternating movements intact and symmetric  Sensation: Intact to light touch bilaterally. No neglect or extinction on double simultaneous testing.  Coordination: No dysmetria on finger-to-nose and heel-to-shin bilaterally  Reflexes: Downgoing toes bilaterally   Gait: Unsteady gait.     NIHSS: 0    Fingerstick Blood Glucose: CAPILLARY BLOOD GLUCOSE      POCT Blood Glucose.: 150 mg/dL (25 Dec 2021 19:03)    LABS:                        13.4   6.99  )-----------( 306      ( 25 Dec 2021 19:30 )             40.2     12-25    144  |  108  |  11  ----------------------------<  155<H>  4.1   |  24  |  0.76    Ca    9.2      25 Dec 2021 19:30    TPro  7.4  /  Alb  4.4  /  TBili  0.2  /  DBili  x   /  AST  23  /  ALT  21  /  AlkPhos  100  12-25    PT/INR - ( 25 Dec 2021 19:30 )   PT: 12.2 sec;   INR: 1.02          PTT - ( 25 Dec 2021 19:30 )  PTT:32.4 sec  CARDIAC MARKERS ( 25 Dec 2021 19:30 )  x     / 0.01 ng/mL / x     / x     / x              RADIOLOGY & ADDITIONAL STUDIES:    HCT:     CTA: < from: CT Angio Neck w/ IV Cont (12.25.21 @ 21:25) >  IMPRESSION:  There is marked diminutive caliber and attenuation of the basilar artery   with complete lack of flow/contrast opacification throughout the proximal   and mid segments of the basilar artery with reconstitution of flow   distally secondary to prominent bilateral posterior communicating   arteries. The bilateral SCA and PCA branches are present and patent.   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    Anterior circulation is unremarkable without evidence of stenosis or   occlusion.    < end of copied text >    < from: CT Angio Neck w/ IV Cont (12.25.21 @ 21:25) >  IMPRESSION: No significant stenosis, occlusion, or dissection of the   cervical carotid or vertebral arteries.    < end of copied text >

## 2021-12-26 VITALS
TEMPERATURE: 99 F | OXYGEN SATURATION: 98 % | HEART RATE: 65 BPM | SYSTOLIC BLOOD PRESSURE: 128 MMHG | RESPIRATION RATE: 17 BRPM | DIASTOLIC BLOOD PRESSURE: 84 MMHG

## 2021-12-26 LAB
ERYTHROCYTE [SEDIMENTATION RATE] IN BLOOD: 15 MM/HR — SIGNIFICANT CHANGE UP
SARS-COV-2 RNA SPEC QL NAA+PROBE: SIGNIFICANT CHANGE UP

## 2021-12-26 PROCEDURE — 82962 GLUCOSE BLOOD TEST: CPT

## 2021-12-26 PROCEDURE — 93005 ELECTROCARDIOGRAM TRACING: CPT

## 2021-12-26 PROCEDURE — 85025 COMPLETE CBC W/AUTO DIFF WBC: CPT

## 2021-12-26 PROCEDURE — 85610 PROTHROMBIN TIME: CPT

## 2021-12-26 PROCEDURE — 85730 THROMBOPLASTIN TIME PARTIAL: CPT

## 2021-12-26 PROCEDURE — 70450 CT HEAD/BRAIN W/O DYE: CPT | Mod: MA

## 2021-12-26 PROCEDURE — U0003: CPT

## 2021-12-26 PROCEDURE — 71045 X-RAY EXAM CHEST 1 VIEW: CPT

## 2021-12-26 PROCEDURE — 70496 CT ANGIOGRAPHY HEAD: CPT | Mod: MA

## 2021-12-26 PROCEDURE — 86140 C-REACTIVE PROTEIN: CPT

## 2021-12-26 PROCEDURE — 85652 RBC SED RATE AUTOMATED: CPT

## 2021-12-26 PROCEDURE — 36415 COLL VENOUS BLD VENIPUNCTURE: CPT

## 2021-12-26 PROCEDURE — 70498 CT ANGIOGRAPHY NECK: CPT | Mod: MA

## 2021-12-26 PROCEDURE — 0042T: CPT

## 2021-12-26 PROCEDURE — 99284 EMERGENCY DEPT VISIT MOD MDM: CPT | Mod: 25

## 2021-12-26 PROCEDURE — 84484 ASSAY OF TROPONIN QUANT: CPT

## 2021-12-26 PROCEDURE — 80053 COMPREHEN METABOLIC PANEL: CPT

## 2021-12-26 PROCEDURE — U0005: CPT

## 2021-12-26 RX ORDER — MECLIZINE HCL 12.5 MG
1 TABLET ORAL
Qty: 18 | Refills: 0
Start: 2021-12-26 | End: 2021-12-28

## 2021-12-26 RX ORDER — SODIUM CHLORIDE 9 MG/ML
1000 INJECTION INTRAMUSCULAR; INTRAVENOUS; SUBCUTANEOUS ONCE
Refills: 0 | Status: COMPLETED | OUTPATIENT
Start: 2021-12-26 | End: 2021-12-26

## 2021-12-26 RX ADMIN — SODIUM CHLORIDE 1000 MILLILITER(S): 9 INJECTION INTRAMUSCULAR; INTRAVENOUS; SUBCUTANEOUS at 02:10

## 2021-12-27 PROBLEM — K27.9 PEPTIC ULCER, SITE UNSPECIFIED, UNSPECIFIED AS ACUTE OR CHRONIC, WITHOUT HEMORRHAGE OR PERFORATION: Chronic | Status: ACTIVE | Noted: 2021-12-26

## 2021-12-27 PROBLEM — E78.5 HYPERLIPIDEMIA, UNSPECIFIED: Chronic | Status: ACTIVE | Noted: 2021-12-26

## 2021-12-29 ENCOUNTER — APPOINTMENT (OUTPATIENT)
Dept: OPHTHALMOLOGY | Facility: CLINIC | Age: 66
End: 2021-12-29
Payer: COMMERCIAL

## 2021-12-29 ENCOUNTER — NON-APPOINTMENT (OUTPATIENT)
Age: 66
End: 2021-12-29

## 2021-12-29 PROCEDURE — 92285 EXTERNAL OCULAR PHOTOGRAPHY: CPT

## 2021-12-29 PROCEDURE — 92004 COMPRE OPH EXAM NEW PT 1/>: CPT

## 2022-02-02 ENCOUNTER — APPOINTMENT (OUTPATIENT)
Dept: OPHTHALMOLOGY | Facility: CLINIC | Age: 67
End: 2022-02-02
Payer: COMMERCIAL

## 2022-02-02 ENCOUNTER — NON-APPOINTMENT (OUTPATIENT)
Age: 67
End: 2022-02-02

## 2022-02-02 PROCEDURE — 92012 INTRM OPH EXAM EST PATIENT: CPT

## 2022-04-04 ENCOUNTER — APPOINTMENT (OUTPATIENT)
Dept: OPHTHALMOLOGY | Facility: CLINIC | Age: 67
End: 2022-04-04

## 2022-07-06 NOTE — ED ADULT NURSE NOTE - EXTENSIONS OF SELF_ADULT
Patient to ED with  with c/o L wrist injury.  About 1 hour ago patient tripped over a  in the garden and tried to break fall with wrist.  Abrasion to L knee.  Swelling to L wrist.     None

## 2024-06-19 PROBLEM — Z00.00 ENCOUNTER FOR PREVENTIVE HEALTH EXAMINATION: Status: ACTIVE | Noted: 2024-06-19

## 2024-08-08 ENCOUNTER — APPOINTMENT (OUTPATIENT)
Dept: DERMATOLOGY | Facility: CLINIC | Age: 69
End: 2024-08-08

## 2024-08-08 ENCOUNTER — TRANSCRIPTION ENCOUNTER (OUTPATIENT)
Age: 69
End: 2024-08-08

## 2024-08-08 PROCEDURE — 99203 OFFICE O/P NEW LOW 30 MIN: CPT

## 2024-08-08 NOTE — HISTORY OF PRESENT ILLNESS
[FreeTextEntry1] : tbse, skin lesion [de-identified] : 70yo F presents for tbse and evaluation of skin lesion accompanied by her son No personal or family hx of skin cancer skin lesion on R cheek - present for several years, sometimes drains liquid, not painful

## 2024-08-08 NOTE — ASSESSMENT
[FreeTextEntry1] : #Favor EIC - R cheek chronic, stable -reassurance regarding benign nature -if removal desired, recommend seeing plastic surgeon  #Multiple benign nevi - chronic, stable - I discussed the chronic nature and course of the condition - Photoprotection discussed, recommend daily broad-spectrum sunscreen, SPF 30 or greater, UPF hat, clothing. - Pt educated on ABCDE of melanoma - Recommend self-skin exam and annual skin exam by MD - Pt instructed to return for new or changing lesions especially if any moles start to change, itch, or bleed   # Seborrheic keratosis, face, trunk/extremities  - chronic, stable -I discussed the chronic nature and course of the condition -reviewed benign nature -discussed electrodessication if removal of SKs on face desired, reviewed out of pocket cost, risk of scarring, PIH, recurrence. can schedule prn.   RTC 1 year for TBSE, sooner PRN

## 2024-08-08 NOTE — PHYSICAL EXAM
[Alert] : alert [Oriented x 3] : ~L oriented x 3 [Full Body Skin Exam Performed] : performed [FreeTextEntry3] : PE:   General: well-appearing, alert, in no acute distress   Full body skin exam performed examining scalp, head, face, ears, eyes, mouth, neck, chest, back, abdomen, axilla, b/l arms, b/l forearms, b/l hands, b/l fingernails, b/l thighs, b/l legs, b/l feet, b/l toenails, groin, buttocks Pertinent findings include: -scattered light brown to dark brown colored <6mm papules and macules without any irregular border, color, or asymmetry on the trunk and extremities, consistent with benign nevi. -brown stuck on papules, plaques on the trunk and extremities, face -small subcutaneous nodule on R cheek